# Patient Record
Sex: MALE | Race: WHITE | HISPANIC OR LATINO | Employment: UNEMPLOYED | URBAN - METROPOLITAN AREA
[De-identification: names, ages, dates, MRNs, and addresses within clinical notes are randomized per-mention and may not be internally consistent; named-entity substitution may affect disease eponyms.]

---

## 2023-04-12 ENCOUNTER — HOSPITAL ENCOUNTER (OUTPATIENT)
Dept: RADIOLOGY | Facility: MEDICAL CENTER | Age: 58
End: 2023-04-12
Attending: EMERGENCY MEDICINE

## 2023-04-12 ENCOUNTER — HOSPITAL ENCOUNTER (OUTPATIENT)
Dept: RADIOLOGY | Facility: MEDICAL CENTER | Age: 58
End: 2023-04-12

## 2023-04-12 ENCOUNTER — HOSPITAL ENCOUNTER (INPATIENT)
Facility: MEDICAL CENTER | Age: 58
LOS: 1 days | DRG: 442 | End: 2023-04-13
Attending: EMERGENCY MEDICINE | Admitting: SURGERY
Payer: COMMERCIAL

## 2023-04-12 DIAGNOSIS — S36.113A LACERATION OF LIVER, INITIAL ENCOUNTER: ICD-10-CM

## 2023-04-12 DIAGNOSIS — T14.90XA TRAUMA: ICD-10-CM

## 2023-04-12 DIAGNOSIS — S22.41XA CLOSED FRACTURE OF MULTIPLE RIBS OF RIGHT SIDE, INITIAL ENCOUNTER: ICD-10-CM

## 2023-04-12 PROBLEM — Z53.09 CONTRAINDICATION TO DEEP VEIN THROMBOSIS (DVT) PROPHYLAXIS: Status: ACTIVE | Noted: 2023-04-12

## 2023-04-12 PROBLEM — S36.116A LIVER LACERATION, GRADE IV, WITHOUT OPEN WOUND INTO CAVITY: Status: ACTIVE | Noted: 2023-04-12

## 2023-04-12 PROBLEM — S36.119A LIVER INJURY, INITIAL ENCOUNTER: Status: ACTIVE | Noted: 2023-04-12

## 2023-04-12 PROBLEM — R93.5 ABNORMAL ABDOMINAL CT SCAN: Status: ACTIVE | Noted: 2023-04-12

## 2023-04-12 PROBLEM — S29.8XXA BLUNT CHEST TRAUMA: Status: ACTIVE | Noted: 2023-04-12

## 2023-04-12 LAB
ABO + RH BLD: NORMAL
ABO GROUP BLD: NORMAL
ALBUMIN SERPL BCP-MCNC: 3.2 G/DL (ref 3.2–4.9)
ALBUMIN/GLOB SERPL: 1.1 G/DL
ALP SERPL-CCNC: 71 U/L (ref 30–99)
ALT SERPL-CCNC: 211 U/L (ref 2–50)
ANION GAP SERPL CALC-SCNC: 11 MMOL/L (ref 7–16)
APTT PPP: 25.9 SEC (ref 24.7–36)
AST SERPL-CCNC: 91 U/L (ref 12–45)
BILIRUB SERPL-MCNC: 0.7 MG/DL (ref 0.1–1.5)
BLD GP AB SCN SERPL QL: NORMAL
BUN SERPL-MCNC: 17 MG/DL (ref 8–22)
CALCIUM ALBUM COR SERPL-MCNC: 9.1 MG/DL (ref 8.5–10.5)
CALCIUM SERPL-MCNC: 8.5 MG/DL (ref 8.5–10.5)
CEA SERPL-MCNC: 1.6 NG/ML (ref 0–3)
CFT BLD TEG: 3.8 MIN (ref 4.6–9.1)
CFT P HPASE BLD TEG: 3.5 MIN (ref 4.3–8.3)
CHLORIDE SERPL-SCNC: 103 MMOL/L (ref 96–112)
CLOT ANGLE BLD TEG: 74.9 DEGREES (ref 63–78)
CLOT LYSIS 30M P MA LENFR BLD TEG: 0.8 % (ref 0–2.6)
CO2 SERPL-SCNC: 20 MMOL/L (ref 20–33)
CREAT SERPL-MCNC: 0.58 MG/DL (ref 0.5–1.4)
CT.EXTRINSIC BLD ROTEM: 1.3 MIN (ref 0.8–2.1)
EKG IMPRESSION: NORMAL
ERYTHROCYTE [DISTWIDTH] IN BLOOD BY AUTOMATED COUNT: 46.1 FL (ref 35.9–50)
ETHANOL BLD-MCNC: <10.1 MG/DL
GFR SERPLBLD CREATININE-BSD FMLA CKD-EPI: 113 ML/MIN/1.73 M 2
GLOBULIN SER CALC-MCNC: 2.8 G/DL (ref 1.9–3.5)
GLUCOSE SERPL-MCNC: 152 MG/DL (ref 65–99)
HCT VFR BLD AUTO: 42.2 % (ref 42–52)
HGB BLD-MCNC: 14.1 G/DL (ref 14–18)
INR PPP: 1.1 (ref 0.87–1.13)
LACTATE SERPL-SCNC: 1.8 MMOL/L (ref 0.5–2)
MCF BLD TEG: 60.7 MM (ref 52–69)
MCF.PLATELET INHIB BLD ROTEM: 18.1 MM (ref 15–32)
MCH RBC QN AUTO: 31.1 PG (ref 27–33)
MCHC RBC AUTO-ENTMCNC: 33.4 G/DL (ref 33.7–35.3)
MCV RBC AUTO: 93 FL (ref 81.4–97.8)
PA AA BLD-ACNC: 97.8 % (ref 0–11)
PA ADP BLD-ACNC: 82.8 % (ref 0–17)
PLATELET # BLD AUTO: 195 K/UL (ref 164–446)
PMV BLD AUTO: 11.5 FL (ref 9–12.9)
POTASSIUM SERPL-SCNC: 4.3 MMOL/L (ref 3.6–5.5)
PROT SERPL-MCNC: 6 G/DL (ref 6–8.2)
PROTHROMBIN TIME: 14.1 SEC (ref 12–14.6)
RBC # BLD AUTO: 4.54 M/UL (ref 4.7–6.1)
RH BLD: NORMAL
SODIUM SERPL-SCNC: 134 MMOL/L (ref 135–145)
TEG ALGORITHM TGALG: ABNORMAL
TROPONIN T SERPL-MCNC: 26 NG/L (ref 6–19)
WBC # BLD AUTO: 8.1 K/UL (ref 4.8–10.8)

## 2023-04-12 PROCEDURE — 86900 BLOOD TYPING SEROLOGIC ABO: CPT

## 2023-04-12 PROCEDURE — 84484 ASSAY OF TROPONIN QUANT: CPT

## 2023-04-12 PROCEDURE — 99285 EMERGENCY DEPT VISIT HI MDM: CPT

## 2023-04-12 PROCEDURE — 99223 1ST HOSP IP/OBS HIGH 75: CPT | Performed by: SURGERY

## 2023-04-12 PROCEDURE — 80053 COMPREHEN METABOLIC PANEL: CPT

## 2023-04-12 PROCEDURE — 85384 FIBRINOGEN ACTIVITY: CPT

## 2023-04-12 PROCEDURE — 93005 ELECTROCARDIOGRAM TRACING: CPT | Performed by: NURSE PRACTITIONER

## 2023-04-12 PROCEDURE — 85610 PROTHROMBIN TIME: CPT

## 2023-04-12 PROCEDURE — 82378 CARCINOEMBRYONIC ANTIGEN: CPT

## 2023-04-12 PROCEDURE — 36415 COLL VENOUS BLD VENIPUNCTURE: CPT

## 2023-04-12 PROCEDURE — 86850 RBC ANTIBODY SCREEN: CPT

## 2023-04-12 PROCEDURE — 85027 COMPLETE CBC AUTOMATED: CPT

## 2023-04-12 PROCEDURE — A9270 NON-COVERED ITEM OR SERVICE: HCPCS | Performed by: SURGERY

## 2023-04-12 PROCEDURE — 85347 COAGULATION TIME ACTIVATED: CPT

## 2023-04-12 PROCEDURE — 85576 BLOOD PLATELET AGGREGATION: CPT | Mod: 91

## 2023-04-12 PROCEDURE — 82077 ASSAY SPEC XCP UR&BREATH IA: CPT

## 2023-04-12 PROCEDURE — 305950 HCHG YELLOW TRAUMA ACT PRE-NOTIFY NO CC

## 2023-04-12 PROCEDURE — 83605 ASSAY OF LACTIC ACID: CPT

## 2023-04-12 PROCEDURE — 770001 HCHG ROOM/CARE - MED/SURG/GYN PRIV*

## 2023-04-12 PROCEDURE — 86901 BLOOD TYPING SEROLOGIC RH(D): CPT

## 2023-04-12 PROCEDURE — 700102 HCHG RX REV CODE 250 W/ 637 OVERRIDE(OP): Performed by: SURGERY

## 2023-04-12 PROCEDURE — 85730 THROMBOPLASTIN TIME PARTIAL: CPT

## 2023-04-12 RX ORDER — ONDANSETRON 2 MG/ML
4 INJECTION INTRAMUSCULAR; INTRAVENOUS EVERY 4 HOURS PRN
Status: DISCONTINUED | OUTPATIENT
Start: 2023-04-12 | End: 2023-04-13 | Stop reason: HOSPADM

## 2023-04-12 RX ORDER — CELECOXIB 200 MG/1
200 CAPSULE ORAL 2 TIMES DAILY PRN
Status: DISCONTINUED | OUTPATIENT
Start: 2023-04-17 | End: 2023-04-13 | Stop reason: HOSPADM

## 2023-04-12 RX ORDER — ACETAMINOPHEN 500 MG
1000 TABLET ORAL 2 TIMES DAILY PRN
Status: ON HOLD | COMMUNITY
End: 2023-04-13

## 2023-04-12 RX ORDER — OXYCODONE HYDROCHLORIDE 5 MG/1
5 TABLET ORAL
Status: DISCONTINUED | OUTPATIENT
Start: 2023-04-12 | End: 2023-04-13 | Stop reason: HOSPADM

## 2023-04-12 RX ORDER — ONDANSETRON 4 MG/1
4 TABLET, ORALLY DISINTEGRATING ORAL EVERY 4 HOURS PRN
Status: DISCONTINUED | OUTPATIENT
Start: 2023-04-12 | End: 2023-04-13 | Stop reason: HOSPADM

## 2023-04-12 RX ORDER — ENEMA 19; 7 G/133ML; G/133ML
1 ENEMA RECTAL
Status: DISCONTINUED | OUTPATIENT
Start: 2023-04-12 | End: 2023-04-13 | Stop reason: HOSPADM

## 2023-04-12 RX ORDER — AMOXICILLIN 250 MG
1 CAPSULE ORAL NIGHTLY
Status: DISCONTINUED | OUTPATIENT
Start: 2023-04-12 | End: 2023-04-13 | Stop reason: HOSPADM

## 2023-04-12 RX ORDER — COVID-19 ANTIGEN TEST
440 KIT MISCELLANEOUS 2 TIMES DAILY
Status: ON HOLD | COMMUNITY
End: 2023-04-13

## 2023-04-12 RX ORDER — CELECOXIB 200 MG/1
200 CAPSULE ORAL 2 TIMES DAILY
Status: DISCONTINUED | OUTPATIENT
Start: 2023-04-12 | End: 2023-04-13 | Stop reason: HOSPADM

## 2023-04-12 RX ORDER — POLYETHYLENE GLYCOL 3350 17 G/17G
1 POWDER, FOR SOLUTION ORAL 2 TIMES DAILY
Status: DISCONTINUED | OUTPATIENT
Start: 2023-04-12 | End: 2023-04-13 | Stop reason: HOSPADM

## 2023-04-12 RX ORDER — ACETAMINOPHEN 500 MG
1000 TABLET ORAL EVERY 6 HOURS PRN
Status: DISCONTINUED | OUTPATIENT
Start: 2023-04-17 | End: 2023-04-13 | Stop reason: HOSPADM

## 2023-04-12 RX ORDER — ACETAMINOPHEN 500 MG
1000 TABLET ORAL EVERY 6 HOURS
Status: DISCONTINUED | OUTPATIENT
Start: 2023-04-12 | End: 2023-04-13 | Stop reason: HOSPADM

## 2023-04-12 RX ORDER — HYDROMORPHONE HYDROCHLORIDE 1 MG/ML
0.5 INJECTION, SOLUTION INTRAMUSCULAR; INTRAVENOUS; SUBCUTANEOUS
Status: DISCONTINUED | OUTPATIENT
Start: 2023-04-12 | End: 2023-04-13 | Stop reason: HOSPADM

## 2023-04-12 RX ORDER — AMOXICILLIN 250 MG
1 CAPSULE ORAL
Status: DISCONTINUED | OUTPATIENT
Start: 2023-04-12 | End: 2023-04-13 | Stop reason: HOSPADM

## 2023-04-12 RX ORDER — DOCUSATE SODIUM 100 MG/1
100 CAPSULE, LIQUID FILLED ORAL 2 TIMES DAILY
Status: DISCONTINUED | OUTPATIENT
Start: 2023-04-12 | End: 2023-04-13 | Stop reason: HOSPADM

## 2023-04-12 RX ORDER — OXYCODONE HYDROCHLORIDE 10 MG/1
10 TABLET ORAL
Status: DISCONTINUED | OUTPATIENT
Start: 2023-04-12 | End: 2023-04-13 | Stop reason: HOSPADM

## 2023-04-12 RX ORDER — BISACODYL 10 MG
10 SUPPOSITORY, RECTAL RECTAL
Status: DISCONTINUED | OUTPATIENT
Start: 2023-04-12 | End: 2023-04-13 | Stop reason: HOSPADM

## 2023-04-12 RX ADMIN — ACETAMINOPHEN 1000 MG: 500 TABLET, FILM COATED ORAL at 20:57

## 2023-04-12 RX ADMIN — CELECOXIB 200 MG: 200 CAPSULE ORAL at 20:58

## 2023-04-12 RX ADMIN — OXYCODONE HYDROCHLORIDE 10 MG: 10 TABLET ORAL at 21:34

## 2023-04-12 ASSESSMENT — COGNITIVE AND FUNCTIONAL STATUS - GENERAL
SUGGESTED CMS G CODE MODIFIER MOBILITY: CH
SUGGESTED CMS G CODE MODIFIER DAILY ACTIVITY: CH
MOBILITY SCORE: 24
DAILY ACTIVITIY SCORE: 24

## 2023-04-12 ASSESSMENT — LIFESTYLE VARIABLES
EVER FELT BAD OR GUILTY ABOUT YOUR DRINKING: NO
EVER HAD A DRINK FIRST THING IN THE MORNING TO STEADY YOUR NERVES TO GET RID OF A HANGOVER: NO
DO YOU DRINK ALCOHOL: NO
TOTAL SCORE: 0
AVERAGE NUMBER OF DAYS PER WEEK YOU HAVE A DRINK CONTAINING ALCOHOL: 0
HAVE YOU EVER FELT YOU SHOULD CUT DOWN ON YOUR DRINKING: NO
HAVE PEOPLE ANNOYED YOU BY CRITICIZING YOUR DRINKING: NO
TOTAL SCORE: 0
ON A TYPICAL DAY WHEN YOU DRINK ALCOHOL HOW MANY DRINKS DO YOU HAVE: 0
TOTAL SCORE: 0
HOW MANY TIMES IN THE PAST YEAR HAVE YOU HAD 5 OR MORE DRINKS IN A DAY: 0
ALCOHOL_USE: YES
CONSUMPTION TOTAL: NEGATIVE

## 2023-04-12 ASSESSMENT — PATIENT HEALTH QUESTIONNAIRE - PHQ9
1. LITTLE INTEREST OR PLEASURE IN DOING THINGS: NOT AT ALL
2. FEELING DOWN, DEPRESSED, IRRITABLE, OR HOPELESS: NOT AT ALL
SUM OF ALL RESPONSES TO PHQ9 QUESTIONS 1 AND 2: 0

## 2023-04-12 ASSESSMENT — PAIN DESCRIPTION - PAIN TYPE: TYPE: ACUTE PAIN

## 2023-04-12 ASSESSMENT — FIBROSIS 4 INDEX: FIB4 SCORE: 1.86

## 2023-04-13 ENCOUNTER — APPOINTMENT (OUTPATIENT)
Dept: RADIOLOGY | Facility: MEDICAL CENTER | Age: 58
DRG: 442 | End: 2023-04-13
Attending: NURSE PRACTITIONER
Payer: COMMERCIAL

## 2023-04-13 ENCOUNTER — PHARMACY VISIT (OUTPATIENT)
Dept: PHARMACY | Facility: MEDICAL CENTER | Age: 58
End: 2023-04-13
Payer: COMMERCIAL

## 2023-04-13 VITALS
RESPIRATION RATE: 17 BRPM | TEMPERATURE: 98.6 F | WEIGHT: 170 LBS | HEIGHT: 70 IN | HEART RATE: 85 BPM | SYSTOLIC BLOOD PRESSURE: 120 MMHG | BODY MASS INDEX: 24.34 KG/M2 | OXYGEN SATURATION: 90 % | DIASTOLIC BLOOD PRESSURE: 80 MMHG

## 2023-04-13 PROBLEM — D69.1 QUALITATIVE PLATELET DISORDER (HCC): Status: ACTIVE | Noted: 2023-04-13

## 2023-04-13 LAB
ALBUMIN SERPL BCP-MCNC: 3.4 G/DL (ref 3.2–4.9)
ALBUMIN/GLOB SERPL: 1.5 G/DL
ALP SERPL-CCNC: 72 U/L (ref 30–99)
ALT SERPL-CCNC: 192 U/L (ref 2–50)
ANION GAP SERPL CALC-SCNC: 10 MMOL/L (ref 7–16)
AST SERPL-CCNC: 79 U/L (ref 12–45)
BASOPHILS # BLD AUTO: 0.4 % (ref 0–1.8)
BASOPHILS # BLD: 0.03 K/UL (ref 0–0.12)
BILIRUB SERPL-MCNC: 1.1 MG/DL (ref 0.1–1.5)
BUN SERPL-MCNC: 14 MG/DL (ref 8–22)
CALCIUM ALBUM COR SERPL-MCNC: 9.1 MG/DL (ref 8.5–10.5)
CALCIUM SERPL-MCNC: 8.6 MG/DL (ref 8.5–10.5)
CHLORIDE SERPL-SCNC: 105 MMOL/L (ref 96–112)
CO2 SERPL-SCNC: 22 MMOL/L (ref 20–33)
CREAT SERPL-MCNC: 0.51 MG/DL (ref 0.5–1.4)
EOSINOPHIL # BLD AUTO: 0.11 K/UL (ref 0–0.51)
EOSINOPHIL NFR BLD: 1.5 % (ref 0–6.9)
ERYTHROCYTE [DISTWIDTH] IN BLOOD BY AUTOMATED COUNT: 45.7 FL (ref 35.9–50)
EST. AVERAGE GLUCOSE BLD GHB EST-MCNC: 197 MG/DL
GFR SERPLBLD CREATININE-BSD FMLA CKD-EPI: 117 ML/MIN/1.73 M 2
GLOBULIN SER CALC-MCNC: 2.3 G/DL (ref 1.9–3.5)
GLUCOSE SERPL-MCNC: 118 MG/DL (ref 65–99)
HBA1C MFR BLD: 8.5 % (ref 4–5.6)
HCT VFR BLD AUTO: 39.8 % (ref 42–52)
HGB BLD-MCNC: 13.4 G/DL (ref 14–18)
IMM GRANULOCYTES # BLD AUTO: 0.04 K/UL (ref 0–0.11)
IMM GRANULOCYTES NFR BLD AUTO: 0.5 % (ref 0–0.9)
LYMPHOCYTES # BLD AUTO: 1.63 K/UL (ref 1–4.8)
LYMPHOCYTES NFR BLD: 21.8 % (ref 22–41)
MCH RBC QN AUTO: 30.9 PG (ref 27–33)
MCHC RBC AUTO-ENTMCNC: 33.7 G/DL (ref 33.7–35.3)
MCV RBC AUTO: 91.9 FL (ref 81.4–97.8)
MONOCYTES # BLD AUTO: 0.81 K/UL (ref 0–0.85)
MONOCYTES NFR BLD AUTO: 10.8 % (ref 0–13.4)
NEUTROPHILS # BLD AUTO: 4.85 K/UL (ref 1.82–7.42)
NEUTROPHILS NFR BLD: 65 % (ref 44–72)
NRBC # BLD AUTO: 0 K/UL
NRBC BLD-RTO: 0 /100 WBC
PLATELET # BLD AUTO: 152 K/UL (ref 164–446)
PMV BLD AUTO: 10.9 FL (ref 9–12.9)
POTASSIUM SERPL-SCNC: 4.4 MMOL/L (ref 3.6–5.5)
PROT SERPL-MCNC: 5.7 G/DL (ref 6–8.2)
RBC # BLD AUTO: 4.33 M/UL (ref 4.7–6.1)
SODIUM SERPL-SCNC: 137 MMOL/L (ref 135–145)
TROPONIN T SERPL-MCNC: 27 NG/L (ref 6–19)
TROPONIN T SERPL-MCNC: 32 NG/L (ref 6–19)
WBC # BLD AUTO: 7.5 K/UL (ref 4.8–10.8)

## 2023-04-13 PROCEDURE — 94760 N-INVAS EAR/PLS OXIMETRY 1: CPT

## 2023-04-13 PROCEDURE — A9270 NON-COVERED ITEM OR SERVICE: HCPCS | Performed by: SURGERY

## 2023-04-13 PROCEDURE — RXMED WILLOW AMBULATORY MEDICATION CHARGE: Performed by: PHYSICIAN ASSISTANT

## 2023-04-13 PROCEDURE — 93970 EXTREMITY STUDY: CPT

## 2023-04-13 PROCEDURE — 94669 MECHANICAL CHEST WALL OSCILL: CPT

## 2023-04-13 PROCEDURE — 99239 HOSP IP/OBS DSCHRG MGMT >30: CPT | Performed by: PHYSICIAN ASSISTANT

## 2023-04-13 PROCEDURE — 36415 COLL VENOUS BLD VENIPUNCTURE: CPT

## 2023-04-13 PROCEDURE — 80053 COMPREHEN METABOLIC PANEL: CPT

## 2023-04-13 PROCEDURE — 85025 COMPLETE CBC W/AUTO DIFF WBC: CPT

## 2023-04-13 PROCEDURE — 83036 HEMOGLOBIN GLYCOSYLATED A1C: CPT

## 2023-04-13 PROCEDURE — 84484 ASSAY OF TROPONIN QUANT: CPT

## 2023-04-13 PROCEDURE — 700102 HCHG RX REV CODE 250 W/ 637 OVERRIDE(OP): Performed by: SURGERY

## 2023-04-13 RX ORDER — OXYCODONE HYDROCHLORIDE 5 MG/1
5 TABLET ORAL EVERY 6 HOURS PRN
Qty: 20 TABLET | Refills: 0 | Status: SHIPPED | OUTPATIENT
Start: 2023-04-13 | End: 2023-04-18

## 2023-04-13 RX ORDER — ACETAMINOPHEN 500 MG
1000 TABLET ORAL EVERY 6 HOURS PRN
Qty: 30 TABLET | Refills: 0 | COMMUNITY
Start: 2023-04-17

## 2023-04-13 RX ORDER — PSEUDOEPHEDRINE HCL 30 MG
100 TABLET ORAL 2 TIMES DAILY PRN
COMMUNITY
Start: 2023-04-13

## 2023-04-13 RX ORDER — CELECOXIB 200 MG/1
200 CAPSULE ORAL 2 TIMES DAILY PRN
Qty: 30 CAPSULE | Refills: 0 | Status: SHIPPED | OUTPATIENT
Start: 2023-04-17 | End: 2023-05-02

## 2023-04-13 RX ADMIN — OXYCODONE HYDROCHLORIDE 10 MG: 10 TABLET ORAL at 10:05

## 2023-04-13 RX ADMIN — ACETAMINOPHEN 1000 MG: 500 TABLET, FILM COATED ORAL at 04:00

## 2023-04-13 RX ADMIN — ACETAMINOPHEN 1000 MG: 500 TABLET, FILM COATED ORAL at 12:03

## 2023-04-13 RX ADMIN — CELECOXIB 200 MG: 200 CAPSULE ORAL at 04:00

## 2023-04-13 RX ADMIN — OXYCODONE HYDROCHLORIDE 10 MG: 10 TABLET ORAL at 04:01

## 2023-04-13 ASSESSMENT — ENCOUNTER SYMPTOMS
NECK PAIN: 1
CHILLS: 0
VOMITING: 0
FEVER: 0
HEADACHES: 0
FOCAL WEAKNESS: 0
MYALGIAS: 1
SENSORY CHANGE: 0
NAUSEA: 0
ABDOMINAL PAIN: 0
SHORTNESS OF BREATH: 0
COUGH: 0
BACK PAIN: 1

## 2023-04-13 ASSESSMENT — PAIN DESCRIPTION - PAIN TYPE
TYPE: ACUTE PAIN
TYPE: ACUTE PAIN

## 2023-04-13 NOTE — ED TRIAGE NOTES
Chief Complaint   Patient presents with    Trauma Yellow     BIBA for above complaint. Transfer from [facility]. Patient reports that an ice dam fell on him yesterday afternoon. Reports pain in left chest started this morning. Went to [facility] because of the pain. Ipad  used to get triage. Patient is A+ox4. S

## 2023-04-13 NOTE — ED PROVIDER NOTES
"  ER Provider Note    Scribed for Cliff Giles M.d. by Deirdre Martinez. 4/12/2023  6:17 PM    Primary Care Provider: No primary care provider noted.    CHIEF COMPLAINT  Chief Complaint   Patient presents with    Trauma Yellow     EXTERNAL RECORDS REVIEWED  External ED Note multiple right-sided rib fractures, grade 4 liver laceration    HPI/ROS  LIMITATION TO HISTORY   Select: : None  OUTSIDE HISTORIAN(S):  EMS assisted with the patients history     James Ford is a 57 y.o. male who presents to the ED has a trauma yellow transfer from [facility] for higher level care of a grade 4 liver laceration and right sided rib fractures. He states that a large piece of ice fell on him yesterday at 11 am. He was standing at the time. He did not have any symptoms yesterday but reports waking up this morning with chest pain. The pain began to increase in severity, prompting him to go to [facility]. While his fractures are on the right side he complains about left sided chest pain that is 7/10. He has a laceration He told EMS that he did not want anymore pain medications. H received morphine and Zofran at [facility] prior to transfer. Patient is Sammarinese speaking and an  was used.    PAST MEDICAL HISTORY  None noted.     SURGICAL HISTORY  None noted.     FAMILY HISTORY  None noted.     SOCIAL HISTORY   reports that he has never smoked. He has never used smokeless tobacco. He reports that he does not drink alcohol and does not use drugs.    CURRENT MEDICATIONS  Current Discharge Medication List        CONTINUE these medications which have NOT CHANGED    Details   Naproxen Sodium (ALEVE) 220 MG Cap Take 440 mg by mouth 2 times a day.      acetaminophen (TYLENOL) 500 MG Tab Take 1,000 mg by mouth 2 times a day as needed.             ALLERGIES  Patient has no allergy information on record.    PHYSICAL EXAM  /78   Pulse 88   Temp 36.9 °C (98.5 °F) (Temporal)   Resp 20   Ht 1.778 m (5' 10\")   Wt " 77.1 kg (170 lb)   SpO2 98%   BMI 24.39 kg/m²   Gen: Alert, oriented  HENT: No racoon eyes, septal hematoma, facial instability.  Abrasion right side of face  Eye: EOMI, no chemosis, PERRL  Neck: trachea midline, no tenderness  Resp: no respiratory distress, bilateral chest wall tenderness or without crepitus  CV: No JVD, RRR.  + peripheral pulses  Abd: soft, non-distended, non-tender. No ecchymosis  Back: no spinal tenderness or deformities  Ext: No deformities, tenderness or edema  Psych: normal mood  Neuro: speech fluent, moves all extremities. GCS 15    DIAGNOSTIC STUDIES    Labs:   Labs Reviewed   CBC WITHOUT DIFFERENTIAL - Abnormal; Notable for the following components:       Result Value    RBC 4.54 (*)     MCHC 33.4 (*)     All other components within normal limits   PLATELET MAPPING WITH BASIC TEG - Abnormal; Notable for the following components:    Reaction Time Initial-R 3.8 (*)     React Time Initial Hep 3.5 (*)     % Inhibition ADP 82.8 (*)     % Inhibition AA 97.8 (*)     All other components within normal limits   COMP METABOLIC PANEL - Abnormal; Notable for the following components:    Sodium 134 (*)     Glucose 152 (*)     AST(SGOT) 91 (*)     ALT(SGPT) 211 (*)     All other components within normal limits    Narrative:     Recollect: Specimen hemolyzed.  Notified: RN [id number]   TROPONIN - Abnormal; Notable for the following components:    Troponin T 26 (*)     All other components within normal limits    Narrative:     Recollect: Specimen hemolyzed.  Notified: RN [id number]   PROTHROMBIN TIME   APTT   LACTIC ACID   COD (ADULT)   CEA   DIAGNOSTIC ALCOHOL    Narrative:     Recollect: Specimen hemolyzed.  Notified: RN [id number]   ABO RH CONFIRM    Narrative:     Recollect: Specimen hemolyzed.  Notified: RN [id number]   CORRECTED CALCIUM    Narrative:     Recollect: Specimen hemolyzed.  Notified: RN [id number]   ESTIMATED GFR    Narrative:     Recollect: Specimen hemolyzed.  Notified: RN [id number]   COMPONENT CELLULAR    OCCULT BLOOD STOOL   HEMOGLOBIN A1C     COURSE & MEDICAL DECISION MAKING     ED Observation Status? No; Patient does not meet criteria for ED Observation.     INITIAL ASSESSMENT, COURSE AND PLAN  Care Narrative: 57-year-old gentleman presents with a story of an ice dam falling from a roof and striking him yesterday.  He was found to have a liver laceration, however has no abdominal tenderness.  Thankfully, no evidence of active extravasation.  He does have multiple right-sided rib fractures although has an equal amount of pain on the left side of his chest, unclear whether these are subacute versus acute.  No signs of pulmonary contusion or pneumothorax.  Case discussed at the bedside with Dr. Roque, trauma surgeon, who will evaluate the patient for hospitalization.  Patient is high risk for decompensation given his extensive solid organ injury.    6:17  PM - Patient seen and examined at bedside.         DISPOSITION AND DISCUSSIONS  I have discussed management of the patient with the following physicians and TEDDY's:  Dr. Roque (Surgery)         CRITICAL CARE  The very real possibilty of a deterioration of this patient's condition required the highest level of my preparedness for sudden, emergent intervention.  I provided critical care services, which included medication orders, frequent reevaluations of the patient's condition and response to treatment, ordering and reviewing test results, and discussing the case with various consultants.  The critical care time associated with the care of the patient was 32 minutes. Review chart for interventions. This time is exclusive of any other billable procedures.       DISPOSITION:  Patient will be hospitalized by Dr. Dr. Roque  in guarded condition.    FINAL DIANGOSIS  1. Closed fracture of multiple ribs of right side, initial encounter    2. Laceration of liver, initial encounter       I, Deirdre Martinez (Anthonyibchristian), am scribing for, and in the presence of, Nestor MADERA  JULIENNE Roque.    Electronically signed by: Deirdre Martinez (Scribe), 4/12/2023    Nestor CAMPOS M.D. personally performed the services described in this documentation, as scribed by Deirdre Martinez in my presence, and it is both accurate and complete.      The note accurately reflects work and decisions made by me.  Cliff Giles M.D.  4/12/2023  9:55 PM

## 2023-04-13 NOTE — ED NOTES
Pt. To 21 elena at this time.  Pt. Connected to b/p and continuous spo2 monitors.  O2 tank being used at 3/4 full at this time.  Pt. Texting on cell phone.  Will obtain  for eval.

## 2023-04-13 NOTE — DISCHARGE SUMMARY
Trauma Discharge Summary    DATE OF ADMISSION: 4/12/2023    DATE OF DISCHARGE: 4/13/2023    LENGTH OF STAY: 2 days    ATTENDING PHYSICIAN: Nestor Roque M.D.    CONSULTING PHYSICIAN:   1. N/A    DISCHARGE DIAGNOSIS:  Active Problems:    Liver laceration, grade IV, without open wound into cavity POA: Yes    Closed fracture of multiple ribs of right side POA: Yes    Contraindication to deep vein thrombosis (DVT) prophylaxis POA: Yes    Blunt chest trauma POA: Yes    Abnormal abdominal CT scan POA: Yes    Trauma POA: Yes  Resolved Problems:    * No resolved hospital problems. *      PROCEDURES:  1. N/A    HISTORY OF PRESENT ILLNESS: The patient is a 57 y.o. male who was reportedly injured when a large piece of ice fell on him the day before. He was hit in the head and chest. The patient was initially evaluated at [facility] where he was found to have rib fractures and a liver laceration. The patient was transferred to [facility] in [place], [place].    HOSPITAL COURSE: The patient was triaged as a trauma yellow transfer activation. The patient was transported to the general surgery josé for care.  The patient was found to have rib fractures on the right 1 through 6.  These are nondisplaced fractures.  This injury was treated nonoperatively with aggressive pulmonary hygiene, multimodal pain management and serial chest radiographs.  The patient's chest radiographs remained stable throughout his stay.  The patient was found to have a grade 4 liver laceration.  The patient presented more than 24 hours after his initial injury.  There was no active extravasation on CT scan.  The patient's hemoglobin was stable.  This injury was treated with serial abdominal examinations and trending of hemoglobin.  The patient's abdominal examinations and hemoglobin remained stable throughout his stay.  The patient was found to have blunt chest trauma.  An EKG and troponins were studied and found to be  consistent with blunt chest trauma.  This was treated with analgesia.  The patient had a incidental finding of possible colonic mass on CT scan.  The patient's CEA was normal at 1.6.  The patient was referred to GI after discharge.  The patient does have a primary care appointment with [facility] next month.   On the day of discharge patient was afebrile, vital signs stable, pain controlled on current regimen, oxygen saturation greater than 90% on room air, ambulating with minimal assistance and tolerating regular diet.  Discharge instructions, medications and follow-up were discussed with the patient in detail.  The patient was then discharged home in stable condition.    HOSPITAL PROBLEM LIST:  Closed fracture of multiple ribs of right side- (present on admission)  Assessment & Plan  Right 1-6th nondisplaced rib fractures.  Aggressive multimodal pain management and pulmonary hygiene. Serial chest radiographs.    Liver laceration, grade IV, without open wound into cavity- (present on admission)  Assessment & Plan  Grade 4 hepatic laceration with no evidence of active extravasation.  24 hrs post injury. Hemoglobin stable.  Trend hemograms.    Abnormal abdominal CT scan- (present on admission)  Assessment & Plan  Incidental finding. Asymmetric focal colonic wall thickening/mass associated with the mesenteric margin of the mid sigmoid colon concerning for colonic mass/carcinoma.   Recommend colonoscopy.    Blunt chest trauma- (present on admission)  Assessment & Plan  EKG and trop consistent with chest trauma.    Contraindication to deep vein thrombosis (DVT) prophylaxis- (present on admission)  Assessment & Plan  Prophylactic anticoagulation for thrombotic prevention initially contraindicated secondary to elevated bleeding risk.  4/13 Trauma surveillance venous duplex scanning ordered.     Trauma- (present on admission)  Assessment & Plan  Ice damn from roof fell on him 24 hrs prior to presenting.  Trauma Yellow  Transfer Activation from [facility] in [place], [place].  Nestor Roque MD. Trauma Surgery.          DISPOSITION: Discharged home in stable condition on 4/13/2023. The patient was counseled and questions were answered. Specifically, signs and symptoms of infection, respiratory decompensation and persistent or worsening pain were discussed and the patient agrees to seek medical attention if any of these develop.    DISCHARGE MEDICATIONS:  The patients controlled substance history was reviewed and a controlled substance use informed consent (if applicable) was provided by [facility] and the patient has been prescribed.     Medication List        START taking these medications        Instructions   celecoxib 200 MG Caps  Start taking on: April 17, 2023  Commonly known as: CELEBREX   Take 1 Capsule by mouth 2 times a day as needed for Mild Pain or Moderate Pain for up to 15 days.  Dose: 200 mg     docusate sodium 100 MG Caps   Take 100 mg by mouth 2 times a day as needed for Constipation.  Dose: 100 mg     oxyCODONE immediate-release 5 MG Tabs  Commonly known as: ROXICODONE   Take 1 Tablet by mouth every 6 hours as needed for Severe Pain for up to 5 days.  Dose: 5 mg            CHANGE how you take these medications        Instructions   acetaminophen 500 MG Tabs  Start taking on: April 17, 2023  What changed:   when to take this  reasons to take this  These instructions start on April 17, 2023. If you are unsure what to do until then, ask your doctor or other care provider.  Commonly known as: TYLENOL   Take 2 Tablets by mouth every 6 hours as needed for Mild Pain or Moderate Pain.  Dose: 1,000 mg            STOP taking these medications      Aleve 220 MG Caps  Generic drug: Naproxen Sodium              ACTIVITY:  As tolerated.  No strenuous activities for 2 weeks.    WOUND CARE:  N/A    DIET:  Regular    FOLLOW UP:  Nestor Roque M.D.  [address]  [place]  [address]  [phone]    Schedule an appointment as soon as possible for a visit in 1 week(s)  For follow up, If symptoms worsen, As needed    [facility]  [address]  [place] [address]  [phone]  Schedule an appointment as soon as possible for a visit  For follow up on focal colonic wall thickening/mass incidentally found on CT imaging.    Jakob Whitfield D.N.P.  [address]  [place] [address] [phone]    Go on 6/1/2023  Please go to appointment at 7:15 AM to discuss referral to GI physician for colonoscopy regarding possible colonic mass found on CT imaging.      TIME SPENT ON DISCHARGE: 35 minutes      ____________________________________________  Bernie Aguilar P.A.-C.    DD: 4/13/2023 2:59 PM

## 2023-04-13 NOTE — CARE PLAN
The patient is Watcher - Medium risk of patient condition declining or worsening    Shift Goals  Clinical Goals: monitor LFT, trops  Patient Goals: comfort, pain control  Family Goals: pain control    Progress made toward(s) clinical / shift goals:    Problem: Knowledge Deficit - Standard  Goal: Patient and family/care givers will demonstrate understanding of plan of care, disease process/condition, diagnostic tests and medications  Outcome: Progressing  Note: Pt updated and understanding of his plan of care.      Problem: Pain - Standard  Goal: Alleviation of pain or a reduction in pain to the patient’s comfort goal  Outcome: Progressing  Note: Pain relieved with current interventions in place.        Pt arrived from ED @ 2030- friend @ the bedside. C/o 10/10 chest pain. PRN medication given with relief. Pt able to sleep. On full liquid diet.-- Plan of care translated via video . Family updated @ the bedside. VSS

## 2023-04-13 NOTE — DISCHARGE PLANNING
Trauma Yellow Transfer    James Liliana  1965    Pt arrived via [facility] as a transfer from [facility]. Pt stated a large ice damn landed on his chest yesterday from the roof. Pt is Slovenian Speaking only, he has his phone with him to keep in touch with family.     SW met with Pt. And used Interpretor Pad to ask for Emergency Contact information. Pt has two friends at bedside. Interpretor Number [phone].     Emergency Contact:   Dawna Irwin [phone] (Friend)   none

## 2023-04-13 NOTE — ED NOTES
58 YO male BIB [facility] transfer from [facility]   Patient states yesterday large ice damn landed on chest, abdomen from his roof.  Denies LOC at the time.   Patient went to [facility] where it is reported his injuries are as follow:   Gd 4 Liver lac   Right sided rib fractures   GCS 15      /71  HR 88  RR 18  95% 2L NC     No known hx, allergies, and medications.

## 2023-04-13 NOTE — CARE PLAN
Problem: Hyperinflation  Goal: Prevent or improve atelectasis  Description: Target End Date:  3 to 4 days    1. Instruct incentive spirometry usage  2.  Perform hyperinflation therapy as indicated  Outcome: Progressing    Respiratory Update    Treatment modality: pep  is 2000  Frequency: QID    Pt tolerating current treatments well with no adverse reactions.

## 2023-04-13 NOTE — PROGRESS NOTES
Received report from previous shift RN  Assessment complete.  A&O x 4, Guinean speaking, communicates well with this RN. Patient calls appropriately.  Patient ambulates with standby assist.   Patient has 8/10 pain. Pain managed with prescribed medications.  Denies N&V. Tolerating regular diet.  Skin per flowsheet.  + void, +flatus, - BM.  Patient denies SOB. Spo2>95% on 2L nasal cannula.  Reviewed plan of care with patient. Call light and personal belongings with in reach. Hourly rounding in place. All needs met at this time.

## 2023-04-13 NOTE — PROGRESS NOTES
Trauma / Surgical Daily Progress Note    Date of Service  4/13/2023    Chief Complaint  57 y.o. male admitted 4/12/2023 with Grade 4 liver lac, rib fractures after ice fell onto him.    Interval Events  SBIRT and tertiary survey completed.  No new injuries, left chest wall tenderness.  Troponin mildly elevated but stable.  Tolerating regular diet.    - PT/OT for d/c safety recommendations  - Mobilize patient  - Patient will need follow up with GI for colonoscopy  - Anticipate d/c home today pending therapy recs    Review of Systems  Review of Systems   Constitutional:  Negative for chills and fever.   Respiratory:  Negative for cough and shortness of breath.    Gastrointestinal:  Negative for abdominal pain, nausea and vomiting.   Genitourinary:         Voiding   Musculoskeletal:  Positive for back pain, myalgias and neck pain.   Neurological:  Negative for sensory change, focal weakness and headaches.      Vital Signs  Temp:  [36.8 °C (98.2 °F)-37.1 °C (98.8 °F)] 36.8 °C (98.2 °F)  Pulse:  [78-96] 80  Resp:  [16-20] 16  BP: ()/(64-81) 112/64  SpO2:  [92 %-98 %] 93 %    Physical Exam  Physical Exam  Vitals and nursing note reviewed.   Constitutional:       General: He is awake. He is not in acute distress.     Appearance: Normal appearance. He is not ill-appearing.   HENT:      Head: Normocephalic.      Right Ear: External ear normal.      Left Ear: External ear normal.      Nose: Nose normal.      Mouth/Throat:      Mouth: Mucous membranes are moist.   Eyes:      General: No scleral icterus.        Right eye: No discharge.         Left eye: No discharge.   Cardiovascular:      Rate and Rhythm: Normal rate and regular rhythm.      Pulses: Normal pulses.   Pulmonary:      Effort: Pulmonary effort is normal. No respiratory distress.      Breath sounds: Examination of the right-lower field reveals decreased breath sounds. Examination of the left-lower field reveals decreased breath sounds. Decreased breath  sounds present.   Chest:      Chest wall: Swelling and tenderness present. No lacerations, deformity or crepitus.      Comments: Left anterior and right lateral tenderness  Abdominal:      General: Abdomen is flat. There is no distension.      Palpations: Abdomen is soft.      Tenderness: There is no abdominal tenderness.   Musculoskeletal:      Cervical back: Neck supple. Muscular tenderness present. No pain with movement or spinous process tenderness.   Skin:     General: Skin is warm and dry.      Capillary Refill: Capillary refill takes less than 2 seconds.      Findings: Bruising, ecchymosis and signs of injury present.   Neurological:      Mental Status: He is alert and oriented to person, place, and time.      GCS: GCS eye subscore is 4. GCS verbal subscore is 5. GCS motor subscore is 6.      Sensory: Sensation is intact.      Motor: Motor function is intact.   Psychiatric:         Attention and Perception: Attention normal.         Mood and Affect: Mood normal.         Behavior: Behavior is cooperative.       Laboratory  Recent Results (from the past 24 hour(s))   CBC WITHOUT DIFFERENTIAL    Collection Time: 04/12/23  5:55 PM   Result Value Ref Range    WBC 8.1 4.8 - 10.8 K/uL    RBC 4.54 (L) 4.70 - 6.10 M/uL    Hemoglobin 14.1 14.0 - 18.0 g/dL    Hematocrit 42.2 42.0 - 52.0 %    MCV 93.0 81.4 - 97.8 fL    MCH 31.1 27.0 - 33.0 pg    MCHC 33.4 (L) 33.7 - 35.3 g/dL    RDW 46.1 35.9 - 50.0 fL    Platelet Count 195 164 - 446 K/uL    MPV 11.5 9.0 - 12.9 fL   Prothrombin Time    Collection Time: 04/12/23  5:55 PM   Result Value Ref Range    PT 14.1 12.0 - 14.6 sec    INR 1.10 0.87 - 1.13   APTT    Collection Time: 04/12/23  5:55 PM   Result Value Ref Range    APTT 25.9 24.7 - 36.0 sec   LACTIC ACID    Collection Time: 04/12/23  5:55 PM   Result Value Ref Range    Lactic Acid 1.8 0.5 - 2.0 mmol/L   PLATELET MAPPING WITH BASIC TEG    Collection Time: 04/12/23  5:55 PM   Result Value Ref Range    Reaction Time  Initial-R 3.8 (L) 4.6 - 9.1 min    React Time Initial Hep 3.5 (L) 4.3 - 8.3 min    Clot Kinetics-K 1.3 0.8 - 2.1 min    Clot Angle-Angle 74.9 63.0 - 78.0 degrees    Maximum Clot Strength-MA 60.7 52.0 - 69.0 mm    TEG Functional Fibrinogen(MA) 18.1 15.0 - 32.0 mm    Lysis 30 minutes-LY30 0.8 0.0 - 2.6 %    % Inhibition ADP 82.8 (H) 0.0 - 17.0 %    % Inhibition AA 97.8 (H) 0.0 - 11.0 %    TEG Algorithm Link Algorithm    COD - Adult (Type and Screen)    Collection Time: 23  5:55 PM   Result Value Ref Range    ABO Grouping Only A     Rh Grouping Only POS     Antibody Screen-Cod NEG    CEA    Collection Time: 23  5:55 PM   Result Value Ref Range    Carcinoembryonic Antigen 1.6 0.0 - 3.0 ng/mL   EKG    Collection Time: 23  6:22 PM   Result Value Ref Range    Report       [facility]    Test Date:  2023  Pt Name:    MICHAEL VALLADARSE  Department: ER  MRN:        [id number]                      Room:        21  Gender:     Male                         Technician: [id number]  :        1965                   Requested By:HILARY DELVALLE  Order #:    [id number]                    Reading MD: Cliff Giles    Measurements  Intervals                                Axis  Rate:       86                           P:          18  WV:         134                          QRS:        -40  QRSD:       93                           T:          13  QT:         363  QTc:        434    Interpretive Statements  Sinus rhythm  Left axis deviation  ST elevation, consider anterior injury  Baseline wander in lead(s) V4  No previous ECG available for comparison  Electronically Signed On 2023 18:35:07 PDT by Cliff Giles     Comp Metabolic Panel    Collection Time: 23  8:22 PM   Result Value Ref Range    Sodium 134 (L) 135 - 145 mmol/L    Potassium 4.3 3.6 - 5.5 mmol/L    Chloride 103 96 - 112 mmol/L    Co2 20 20 - 33 mmol/L    Anion Gap 11.0 7.0 - 16.0    Glucose 152 (H)  65 - 99 mg/dL    Bun 17 8 - 22 mg/dL    Creatinine 0.58 0.50 - 1.40 mg/dL    Calcium 8.5 8.5 - 10.5 mg/dL    AST(SGOT) 91 (H) 12 - 45 U/L    ALT(SGPT) 211 (H) 2 - 50 U/L    Alkaline Phosphatase 71 30 - 99 U/L    Total Bilirubin 0.7 0.1 - 1.5 mg/dL    Albumin 3.2 3.2 - 4.9 g/dL    Total Protein 6.0 6.0 - 8.2 g/dL    Globulin 2.8 1.9 - 3.5 g/dL    A-G Ratio 1.1 g/dL   DIAGNOSTIC ALCOHOL    Collection Time: 04/12/23  8:22 PM   Result Value Ref Range    Diagnostic Alcohol <10.1 <10.1 mg/dL   TROPONIN    Collection Time: 04/12/23  8:22 PM   Result Value Ref Range    Troponin T 26 (H) 6 - 19 ng/L   ABO Rh Confirm    Collection Time: 04/12/23  8:22 PM   Result Value Ref Range    ABO Rh Confirm A POS    CORRECTED CALCIUM    Collection Time: 04/12/23  8:22 PM   Result Value Ref Range    Correct Calcium 9.1 8.5 - 10.5 mg/dL   ESTIMATED GFR    Collection Time: 04/12/23  8:22 PM   Result Value Ref Range    GFR (CKD-EPI) 113 >60 mL/min/1.73 m 2   CBC with Differential: Tomorrow AM    Collection Time: 04/13/23  2:26 AM   Result Value Ref Range    WBC 7.5 4.8 - 10.8 K/uL    RBC 4.33 (L) 4.70 - 6.10 M/uL    Hemoglobin 13.4 (L) 14.0 - 18.0 g/dL    Hematocrit 39.8 (L) 42.0 - 52.0 %    MCV 91.9 81.4 - 97.8 fL    MCH 30.9 27.0 - 33.0 pg    MCHC 33.7 33.7 - 35.3 g/dL    RDW 45.7 35.9 - 50.0 fL    Platelet Count 152 (L) 164 - 446 K/uL    MPV 10.9 9.0 - 12.9 fL    Neutrophils-Polys 65.00 44.00 - 72.00 %    Lymphocytes 21.80 (L) 22.00 - 41.00 %    Monocytes 10.80 0.00 - 13.40 %    Eosinophils 1.50 0.00 - 6.90 %    Basophils 0.40 0.00 - 1.80 %    Immature Granulocytes 0.50 0.00 - 0.90 %    Nucleated RBC 0.00 /100 WBC    Neutrophils (Absolute) 4.85 1.82 - 7.42 K/uL    Lymphs (Absolute) 1.63 1.00 - 4.80 K/uL    Monos (Absolute) 0.81 0.00 - 0.85 K/uL    Eos (Absolute) 0.11 0.00 - 0.51 K/uL    Baso (Absolute) 0.03 0.00 - 0.12 K/uL    Immature Granulocytes (abs) 0.04 0.00 - 0.11 K/uL    NRBC (Absolute) 0.00 K/uL   Comp Metabolic Panel (CMP):  Tomorrow AM    Collection Time: 04/13/23  2:26 AM   Result Value Ref Range    Sodium 137 135 - 145 mmol/L    Potassium 4.4 3.6 - 5.5 mmol/L    Chloride 105 96 - 112 mmol/L    Co2 22 20 - 33 mmol/L    Anion Gap 10.0 7.0 - 16.0    Glucose 118 (H) 65 - 99 mg/dL    Bun 14 8 - 22 mg/dL    Creatinine 0.51 0.50 - 1.40 mg/dL    Calcium 8.6 8.5 - 10.5 mg/dL    AST(SGOT) 79 (H) 12 - 45 U/L    ALT(SGPT) 192 (H) 2 - 50 U/L    Alkaline Phosphatase 72 30 - 99 U/L    Total Bilirubin 1.1 0.1 - 1.5 mg/dL    Albumin 3.4 3.2 - 4.9 g/dL    Total Protein 5.7 (L) 6.0 - 8.2 g/dL    Globulin 2.3 1.9 - 3.5 g/dL    A-G Ratio 1.5 g/dL   TROPONIN    Collection Time: 04/13/23  2:26 AM   Result Value Ref Range    Troponin T 27 (H) 6 - 19 ng/L   HEMOGLOBIN A1C    Collection Time: 04/13/23  2:26 AM   Result Value Ref Range    Glycohemoglobin 8.5 (H) 4.0 - 5.6 %    Est Avg Glucose 197 mg/dL   CORRECTED CALCIUM    Collection Time: 04/13/23  2:26 AM   Result Value Ref Range    Correct Calcium 9.1 8.5 - 10.5 mg/dL   ESTIMATED GFR    Collection Time: 04/13/23  2:26 AM   Result Value Ref Range    GFR (CKD-EPI) 117 >60 mL/min/1.73 m 2   TROPONIN    Collection Time: 04/13/23  5:03 AM   Result Value Ref Range    Troponin T 32 (H) 6 - 19 ng/L       Fluids    Intake/Output Summary (Last 24 hours) at 4/13/2023 0703  Last data filed at 4/12/2023 1757  Gross per 24 hour   Intake 0 ml   Output 0 ml   Net 0 ml       Core Measures & Quality Metrics  Labs reviewed, Medications reviewed and Radiology images reviewed  Dunn catheter: No Dunn      DVT Prophylaxis: Contraindicated - High bleeding risk  DVT prophylaxis - mechanical: SCDs  Ulcer prophylaxis: Not indicated    Assessed for rehab: Patient was assess for and/or received rehabilitation services during this hospitalization  RAP Score Total: 6  CAGE Results: negative Blood Alcohol>0.08: no     Assessment/Plan  Closed fracture of multiple ribs of right side- (present on admission)  Assessment & Plan  Right  1-6th nondisplaced rib fractures.  Aggressive multimodal pain management and pulmonary hygiene. Serial chest radiographs.    Liver laceration, grade IV, without open wound into cavity- (present on admission)  Assessment & Plan  Grade 4 hepatic laceration with no evidence of active extravasation.  24 hrs post injury. Hemoglobin stable.  Trend hemograms.    Abnormal abdominal CT scan- (present on admission)  Assessment & Plan  Incidental finding. Asymmetric focal colonic wall thickening/mass associated with the mesenteric margin of the mid sigmoid colon concerning for colonic mass/carcinoma.   Recommend colonoscopy.    Blunt chest trauma- (present on admission)  Assessment & Plan  EKG, trop pending.    Contraindication to deep vein thrombosis (DVT) prophylaxis- (present on admission)  Assessment & Plan  Prophylactic anticoagulation for thrombotic prevention initially contraindicated secondary to elevated bleeding risk.  4/13 Trauma surveillance venous duplex scanning ordered.     Trauma- (present on admission)  Assessment & Plan  Ice damn from roof fell on him 24 hrs prior to presenting.  Trauma Yellow Transfer Activation from [facility] in [place], [place].  Nestor Roque MD. Trauma Surgery.    Mental status adequate for full examination?: Yes    Spine cleared (radiologically and/or clinically): Yes    All current laboratory studies/radiology exams reviewed: Yes    Medications reconciliation has been reviewed: Yes    Completed Consultations:  N/A     Pending Consultations:  None    Newly Identified Diagnoses and Injuries:  Left chest wall contusion      Discussed patient condition with RN, Therapies, Patient, and trauma surgery. Dr. Roque

## 2023-04-13 NOTE — CONSULTS
Nestor Roque M.D.    Date & Time note created:    4/12/2023   7:17 PM     Referring MD / APRN:  No primary care provider on file., Nestor Roque M.D.    Patient ID:  Name:             James Ford     YOB: 1965  Age:                 57 y.o.  male   MRN:               [id number]    Chief Complaint/Reason for Visit:     Trauma Yellow  Patient was transferred from [facility].  His history initially given was that and I stand him fell off the roof and knocked him down yesterday morning.  He had some injuries to his face and chest but really was not in very much pain.  He apparently did not seek any medical attention it is unclear what his activity was until 6 AM this morning when he woke up this morning he said he had some pain in his left chest.  This seemed to worsen over the course of the day and his nose began to hurting more and ultimately he decided to go to the hospital for evaluation.  There is his chief complaint was nasal pain.  He did have some stigmata of craniofacial trauma and was noted on physical examination to have pain and some palpable crepitus in the right chest.  This led to a rather exhaustive radiologic evaluation it should be noted he was hemodynamically stable.  CT scans of the face and head showed some sinus congestion but no other abnormalities or fractures.  CT scan of his cervical thoracic and lumbar spines were negative for fracture but did show some degenerative diseases.  CT scan of his chest showed 4 right-sided rib fractures without pulmonary parenchymal or mediastinal injury.  These were noted minimally displaced.  CT scan of the abdomen was interpreted as showing a grade 4 liver laceration without any free fluid in the abdomen or subcapsular fluid of the liver.  He was noted to have no lymphadenopathy of the abdomen.  There was a questionable abnormality involving the sigmoid colon with an eccentric fatty density mass on the  antimesenteric border which the radiologist thought might represent a colon cancer the patient has not had any hematochezia or obstructive symptoms of the colon but has not had previous colonoscopy.  Medically he claims to have had a diagnosis of diabetes but does not take any medication he is not really complaining of abdominal pain.  He is tender in the right chest but interestingly he was complaining of left-sided chest pain.        Review of Systems:  ROS patient is complaining of some left chest pain and nasal pain he denies any difficulty with his vision.  He has no headache.  He denies abdominal pain he does have some right chest wall tenderness.  He has not had any hematuria or hematochezia    Past Medical History:   History reviewed. No pertinent past medical history.    Past Surgical History:  History reviewed. No pertinent surgical history.    Current Outpatient Medications:  Current Facility-Administered Medications   Medication Dose Route Frequency Provider Last Rate Last Admin    Respiratory Therapy Consult   Nebulization Continuous RT Nestor Roque M.D.        Pharmacy Consult Request ...Pain Management Review 1 Each  1 Each Other PHARMACY TO DOSE Nestor Roque M.D.        ondansetron (ZOFRAN) syringe/vial injection 4 mg  4 mg Intravenous Q4HRS PRN Nestor Roque M.D.        ondansetron (ZOFRAN ODT) dispertab 4 mg  4 mg Oral Q4HRS PRN Nestor Roque M.D.        docusate sodium (COLACE) capsule 100 mg  100 mg Oral BID Nestor Roque M.D.        senna-docusate (PERICOLACE or SENOKOT S) 8.6-50 MG per tablet 1 Tablet  1 Tablet Oral Nightly Nestor Roque M.D.        senna-docusate (PERICOLACE or SENOKOT S) 8.6-50 MG per tablet 1 Tablet  1 Tablet Oral Q24HRS PRN Nestor Roque M.D.        polyethylene glycol/lytes (MIRALAX) PACKET 1 Packet  1 Packet Oral BID Nestor Roque M.D.        [START ON 4/13/2023] magnesium hydroxide (MILK OF MAGNESIA) suspension 30 mL  30 mL Oral DAILY Nestor Roque M.D.         bisacodyl (DULCOLAX) suppository 10 mg  10 mg Rectal Q24HRS PRN Nestor Roque M.D.        sodium phosphate (Fleet) enema 133 mL  1 Each Rectal Once PRN Nestor Roque M.D.        acetaminophen (TYLENOL) tablet 1,000 mg  1,000 mg Oral Q6HRS Nestor Roque M.D.        Followed by    [START ON 4/17/2023] acetaminophen (TYLENOL) tablet 1,000 mg  1,000 mg Oral Q6HRS PRN Nestor Roque M.D.        celecoxib (CELEBREX) capsule 200 mg  200 mg Oral BID Nestor Roque M.D.        Followed by    [START ON 4/17/2023] celecoxib (CELEBREX) capsule 200 mg  200 mg Oral BID PRN Nestor Roque M.D.        oxyCODONE immediate-release (ROXICODONE) tablet 5 mg  5 mg Oral Q3HRS PRN Nestor Roque M.D.        Or    oxyCODONE immediate release (ROXICODONE) tablet 10 mg  10 mg Oral Q3HRS PRN Nestor Roque M.D.        Or    HYDROmorphone (Dilaudid) injection 0.5 mg  0.5 mg Intravenous Q3HRS PRN Nestor Roque M.D.         No current outpatient medications on file.       Allergies:  No Known Allergies    Family History:  History reviewed. No pertinent family history.    Social History:  Social History     Socioeconomic History    Marital status: Not on file     Spouse name: Not on file    Number of children: Not on file    Years of education: Not on file    Highest education level: Not on file   Occupational History    Not on file   Tobacco Use    Smoking status: Never    Smokeless tobacco: Never   Substance and Sexual Activity    Alcohol use: Never    Drug use: Never    Sexual activity: Not on file   Other Topics Concern    Not on file   Social History Narrative    Not on file     Social Determinants of Health     Financial Resource Strain: Not on file   Food Insecurity: Not on file   Transportation Needs: Not on file   Physical Activity: Not on file   Stress: Not on file   Social Connections: Not on file   Intimate Partner Violence: Not on file   Housing Stability: Not on file          Physical Exam:  Physical Exam  Patient is a healthy  "appearing  male stated age.  He does have some bruising across the face and almost like a blister type injury to the right cheek.  There is no facial instability.  His cranial nerves are intact.  He does not have malocclusion.  His neck is completely nontender he is not in a cervical collar he has good range of motion no laryngeal crepitus no thyroid abnormality no jugular venous distention no palpable lymphadenopathy and his trachea is midline.  There is actually some crepitus in the right chest wall and some tenderness.  This is lateral.  There is no clavicular abnormality no scapular out abnormality no abnormalities of the upper extremity his breath sounds are clear bilaterally heart tones are normal abdomen is soft and benign with minimal tenderness in the right upper quadrant there is no peritoneal irritation no real bruising noted in the right upper quadrant of the abdomen or right lateral chest wall.  There is no palpable masses in the lower abdomen.  He has no inguinal lymphadenopathy his genitalia are normal his lower extremities appear to be atraumatic.  Oriented x 3  Weight/BMI: Body mass index is 24.39 kg/m².  /78   Pulse 88   Temp 36.9 °C (98.5 °F) (Temporal)   Resp 20   Ht 1.778 m (5' 10\")   Wt 77.1 kg (170 lb)   SpO2 98%     Not recorded         Pertinent Lab/Test/Imaging Review:  Patient's radiologic evaluation is basically summarized above.  I am completely unsure about the interpretation sigmoid colon.  He does have a low-density somewhat irregularly shaped area in the central portion of the right lobe of the liver which does not abut up against the kerwin.  I suppose technically it is at least a grade 3 liver injury it does not really look like metastatic disease to me.    Assessment and Plan:     Closed fracture of multiple ribs of right side- (present on admission)  Assessment & Plan  Right 1-6th nondisplaced rib fractures.  Aggressive multimodal pain management and pulmonary " hygiene. Serial chest radiographs.    Liver laceration, grade IV, without open wound into cavity- (present on admission)  Assessment & Plan  Grade 4 hepatic laceration with no evidence of active extravasation.    Blunt chest trauma- (present on admission)  Assessment & Plan  EKG, trop pending.    Contraindication to deep vein thrombosis (DVT) prophylaxis- (present on admission)  Assessment & Plan  Prophylactic anticoagulation for thrombotic prevention initially contraindicated secondary to elevated bleeding risk.    Trauma- (present on admission)  Assessment & Plan  Ice damn from roof fell on him 24 hrs prior to presenting.  Trauma Yellow Transfer Activation from [facility] in [place], [place].  Nestor Roque MD. Trauma Surgery.      Considering the patient's stability and somewhat inconsistent history over the last 36 hours I think the age of these injuries are probably unknown.  The patient has however passed the test of time already.  It seems reasonable to admit him for short period of observation as an inpatient.  We will trend liver function test check a CEA and a troponin.  I do not think ICU observation is warranted    Nestor Roque M.D.

## 2023-04-13 NOTE — ASSESSMENT & PLAN NOTE
Incidental finding. Asymmetric focal colonic wall thickening/mass associated with the mesenteric margin of the mid sigmoid colon concerning for colonic mass/carcinoma.  CEA 1.6.  Recommend colonoscopy outpatient.

## 2023-04-13 NOTE — ASSESSMENT & PLAN NOTE
Grade 4 hepatic laceration with no evidence of active extravasation.  24 hrs post injury. Hemoglobin stable.  Trend hemograms.

## 2023-04-13 NOTE — DISCHARGE INSTRUCTIONS
- Call or seek medical attention for questions or concerns  - Follow up with the [facility] RETURN: 1 week  - Follow up with a primary care provider, if you are not established please do so so that you may be referred to gastroenterology for colonoscopy  - Please have colonoscopy performed as soon as possible after discharge  - Resume regular diet  - May take over the counter acetaminophen as needed for pain  - Continue daily over the counter stool softener while on narcotics  - No operation of machinery or motorized vehicles while under the influence of narcotics  - No alcohol, marijuana or illicit drug use while under the influence of narcotics  - In the event of a narcotic overdose naloxone (Narcan) is available without a prescription from any [facility] or [facility]  - No swimming, hot tubs, baths or wound submersion until cleared by outpatient provider. May shower  - No contact sports, strenuous activities, or heavy lifting until cleared by outpatient provider  - If respiratory decompensation, persistent or worsening pain or signs or symptoms of infection occur seek medical attention        - Llame o busque atención médica si tiene preguntas o inquietudes  - Seguimiento con la [facility]: 1 semana  - Cici un seguimiento con un proveedor de atención primaria, si no está establecido, hágalo para que lo remitan a gastroenterología para imelda colonoscopia.  - Realícese imelda colonoscopia lo antes posible después del remington.  - Reanudar la dieta habitual.  - Puede davin paracetamol de venta rock según sea necesario para el dolor  - Continúe diariamente con el ablandador de heces de venta rock mientras melissa narcóticos  - No operar maquinaria o vehículos motorizados bajo la influencia de estupefacientes  - No consumir alcohol, marihuana o drogas ilícitas bajo la influencia de estupefacientes  - En el jessy de imelda sobredosis de narcóticos, la naloxona  (Narcan) está disponible sin receta en cualquier [facility] o [facility]  - No nadar, jacuzzis, kylie o sumergir heridas hasta que lo autorice un proveedor ambulatorio. Puede ducharse  - No deportes de contacto, actividades extenuantes o levantar objetos pesados hasta que lo autorice un proveedor ambulatorio  - Si se presenta descompensación respiratoria, dolor persistente o que empeora o signos o síntomas de infección buscar atención médica      Cuidados de imelda fractura (genérico)  (Fracture Care, Generic)  Los 206 huesos del cuerpo humano son importantes para sostenerlo (esqueleto) y también para la producción de glóbulos rojos en la médula ósea. Imelda fractura es la ruptura de un tejido. Un tejido es imelda acumulación de células que realizan imelda función en nuestro cuerpo. Normalmente pensamos en fracturas de huesos. Cuando un hueso se rompe o fractura, afecta no sólo a la función y a la producción de yara. También podría diana lesiones u otros daños cuando se lesionan las estructuras cercana a los huesos.   Hay jimmy tipos principales de fracturas:  Abierta cuando imelda herida lleva al sitio de la fractura o el hueso sale hacia afuera de la piel.   Cerrada cuando el hueso está fracturado darby no hay imelda herida externa.   Complicada esto puede suponer daño a los órganos vitales asociados y vasos sanguíneos mayores lyudmila resultado de la fractura.   Las fracturas normalmente se controlan manteniendo los huesos en scott lugar el tiempo suficiente para que se curen. [place] generalmente se realiza utilizando un entablillado o yeso. A veces se requiere cirugía y se utilizan clavos, placas o tornillos para sostener las fracturas en la posición adecuada. La cantidad de tiempo que melissa imelda fractura en curarse depende mayormente de la edad y la winsome del paciente.  Los niños pequeños son propensos a las fracturas. Ericka tipo de fracturas se curan rápidamente. Las fracturas más comunes sufridas por los niños tienden a asociarse con  "los brazos y muñecas. Deandre los huesos jóvenes no se endurecen por algunos años, las fractura de los niños tienden a \"doblarse y astillarse\", deandre imelda alicia rota de un árbol. Por eso se la llama \"fractura en tallo tang\".  A medida que crecemos, podría diana imelda pérdida del hueso conocida deandre osteopenia u osteoporosis. Estas enfermedades hacen que un hueso se rompa muy fácilmente. A veces un accidente iesha o el simple sobre-uso podría producir imelda fractura. Estas fracturas no se curan tan rápido deandre en imelda persona joven.  SÍNTOMAS  Los signos y síntomas de las fracturas de huesos dependen de qué tan grave sea la lesión. Si hay shock, podría diana piel pálida, fría y húmeda con pulso rápido y débil. Suele diana dolor e molestias en la roger de la fractura. Puede o no diana deformidad del hueso. Podría diana lesión en los tejidos que lo rodean.  TRATAMIENTO  El cuidado y tratamiento de las fracturas se basa en la inmovilización y entablillado adecuado de la lesión. Si la fractura es compleja, la herida asociada con imelda fractura abierta podría ser difícil de manipular sin ayuda profesional.   Si no puede restaurarse el pulso del extremo del miembro (pulso distal) con imelda tracción suave, deberá estabilizarse en scott posición actual. Deberá llamar a la ambulancia con urgencia. No pierda tiempo con el entablillado.   En general, las fracturas de miembros deberán inmovilizarse y asistirse por médicos. En zonas lejanas o a alguna distancia de la asistencia médica, puede requerir el siguiente tratamiento.   FRACTURA DEL ANTEBRAZO  Controle el pulso en el extremo del miembro. Si no lo hay, realice imelda tracción suave hasta que aparezca.   Trate cualquier herida.   Cubra en forma acolchada las prominencias óseas.   Coloque un entablillado adecuado.   Asegure las partes superiores e inferiores de la fractura, asegure las muñecas.   Reexamine el pulso o la reaparición de color y calor.   Eleve la lesión con un cabestrillo. "   FRACTURA DEL BRAZO  Controle el pulso en el extremo del miembro, si no lo hay, realice imelda tracción suave hasta que aparezca.   Trate cualquier herida.   Vende en forma acolchada entre el brazo y el pecho.   Coloque un cabestrillo con amanda ortopédico, asegure por encima y por debajo la fractura firmemente contra el pecho con un vendaje triangular.   Reexamine el pulso o la reaparición de color y calor.   PIERNA FRACTURADA  Controle el pulso y la circulación en el extremo del miembro (color de la piel y temperatura). Si no hay circulación, realice imelda tracción suave hasta que aparezca el pulso o el color.   Llame a imelda ambulancia al 911.   Trate cualquier herida.   Inmovilice el miembro.   Cubra acolchadamente las prominencias óseas.   Reexamine la circulación por debajo de la lesión.   FRACTURA DE PELVIS  Llame a imelda ambulancia al 911.   Controle el pulso en ambas piernas.   Doble las piernas por las rodillas, eleve suavemente la parte baja de las piernas y apóyelas en almohadas o algo acolchado.   Apoye las caderas sobre frazadas dobladas de cada lado.   Desaliente el intento de orinar.   Estos cuidados deben realizarse cuando se sospecha imelda fractura de pelvis. Esta lesión puede tener complicaciones graves. Deberá transportase siempre en imelda ambulancia y no por medios alternativos a menos que sea absolutamente necesario.   FRACTURA DE MANDÍBULA  Imelda lesión común en ciertos deportes de contacto es la dislocación o fractura de la parte inferior de la shantell (mandíbula). El herido sentirá dolor en la mandíbula, no podrá hablar willem y puede tener problemas para tragar.   Llame a imelda ambulancia al 911.   Sostenga la mandíbula.   Siente a la persona lesionada inclinada levemente hacia adelante.   Cici apoyar la mandíbula sobre imelda almohadilla sostenida por la misma persona herida.   NO aplique un vendaje para sostenerla.   Observe cuidadosamente al herido para detectar señales de dificultades para respirar, y  "cualquier signo de que se esté poniendo confuso o inconsciente.   CABESTRILLOS  Los cabestrillos se utilizan para sostener un brazo lesionado o para tratar otra lesión lyudmila imelda eduar fracturada. En general, el cabestrillo más efectivo se realiza con un vendaje triangular. Cualquier botiquín de primeros auxilios, debe tener al menos esperanza de estos vendajes.   Aunque son preferible los vendajes triangulares, puede utilizarse cualquier material (corbata, cinturón o un pedazo de cordel o soga) en ejssy de emergencia. Si no hay ningún material parecido a mano, el brazo lesionado puede sostenerse adecuadamente insertándolo dentro de la camisa o blusa de la persona. De manera similar, un imperdible que sostenga la manga junto a la camisa sobre el pecho, será suficiente.   Hay jimmy tipos esenciales de cabestrillos: el de brazo para lesiones del antebrazo, el elevado para lesiones en el hombro, y el cabestrillo con amanda ortopédico o ballestrinque para el brazo y lyudmila apoyo suplementario para la fractura de eduar.   Luego de la colocación de cualquier cabestrillo, controle siempre la circulación de la extremidad tomando el pulso en la ede, o presionando en un uña y observando el cambio de color en koko. Todos los cabestrillos deben estar en imelda posición cómoda para el herido. Nunca fuerce el brazo hacia la \"posición correcta\".   CABESTRILLO DE BRAZO  Coloque el antebrazo lesionado de forma paralela al suelo con la ede un poco más elevada que el codo.   Coloque un vendaje triangular entre el cuerpo y el brazo, con el ápice hacia el codo.   Extienda la parte superior del vendaje sobre el hombro del lado no afectado.   Coloque el punto mas bajo sobre el brazo, cruzando el hombro del lado lesionado para unirlo con la parte superior y átelo de manera firme con un nudo.   Asegure el codo doblando el exceso de vendaje sobre él y coloque un imperdible.   CABESTRILLO ELEVADO  Apoye el brazo lesionado con el codo al lado del " "cuerpo y la mano extendida hacia el hombro no lesionado.   Coloque un vendaje triangular sobre el antebrazo y la mano, con el ápice hacia el codo.   Extienda la parte superior del vendaje sobre el hombro del lado no afectado.   Pliegue la parte baja del vendaje bajo el brazo no lesionado, colóquelo debajo del codo y alrededor de la espalda y extienda la parte inferior para juntarla con la superior a la altura del codo.   Átelo de manera firme con un nudo.   Asegure el codo doblando el exceso del vendaje y colocando un imperdible, luego asegúrese de que el cabestrillo esté plegado debajo del brazo de manera firme.   CABESTRILLO CON DOREEN ORTOPÉDICO (BALLESTRINQUE).  Coloque el codo en imelda posición natural y coloque la mano extendida hacia el hombro sobre el lado no afectado.   Forme un ballestrinque con dos vueltas: imelda hacia usted y la otra separada de usted.   Junte los jarrod deslizando negin mano por debajo de ellos y cerrando con un movimiento de \"aplauso\". Si sabe hacer un ballestrinque, avtar esperanza directamente en la ede darby tenga cuidado de no  el brazo lesionado.   Deslice el ballestrinque sobre la mano y empuje suavemente para asegurar la ede.   Extienda los puntos de la venda para un lado del doreen y átelo de manera firme con un nudo.   Deje colgar el brazo de forma cómoda. Para apoyo a costillas fracturadas, coloque un vendaje triangular alrededor del cuerpo para sostener el brazo firmemente contra el pecho.   Si el médico le ha dado fecha para imelda visita de control, es importante que concurra. [place] incluye derivaciones ortopédicas, fisioterapia y rehabilitación. Toda demora en obtener la asistencia necesaria puede john paul lyudmila resultado imelda demora o fracaso en la adecuada curación. No cumplir con lori control puede john paul lyudmila resultado que el daño, el dolor o la discapacidad edward permanentes. Si tiene problemas para asistir al control, deberá comunicarlo en lori establecimiento para recibir " asesoramiento.   Document Released: 04/05/2010 Document Revised: 03/11/2013  ExitCare® Patient Information ©2013 [employer].    Laceración hepática  Liver Laceration    Imelda laceración hepática es un desgarro o un lee en el hígado. El hígado es un órgano que participa en muchas funciones corporales importantes. En algunos casos, imelda laceración hepática puede ser imelda lesión muy grave. Puede provocar imelda gran hemorragia y es posible que necesite cirugía. Otras veces, imelda laceración hepática puede ser iesha y es posible que solo necesite reposo en la cama. De cualquier manera, dilip siempre se requiere un tratamiento en el hospital.  Las laceraciones del hígado se clasifican en grados del 1 al 5. Los números más bajos identifican las laceraciones que son menos graves que aquellas indicadas en los números más altos.  Tommy 1: Se trata de imelda ruptura en el revestimiento exterior del hígado. Es de menos de ½ pulgada (1 cm) de profundidad.  Tommy 2: Es un desgarro de alrededor de ½ a 1 pulgada (1 a 3 cm) de profundidad. Tiene menos de 4 pulgadas (10 cm) de nadja.  Tommy 3: Es un desgarro de algo más que 1 pulgada (3 cm) de profundidad.  Grados 4 y 5: Estas heridas son muy profundas. Afectan gran parte del hígado.  ¿Cuáles son las causas?  Esta afección puede ser causada por lo siguiente:  Un golpe brandon en la roger alrededor del hígado (traumatismo cerrado), lyudmila en un accidente de automóvil. Un traumatismo puede desgarrar el hígado a pesar de que no se lastime la piel.  Imelda lesión en la que un objeto pasa a través de la piel hacia el hígado (lesión penetrante), lyudmila imelda herida de puñal o de suri.  ¿Cuáles son los signos o los síntomas?  Los síntomas frecuentes de esta afección incluyen los siguientes:  Un abdomen hinchado y firme.  Dolor en el abdomen.  Dolor al presionar sobre el lado derecho del abdomen.  Otros síntomas pueden ser los siguientes:  Sangrado que proviene de imelda herida penetrante.  Moretones en el  abdomen.  Latidos cardíacos acelerados.  Respiración rápida.  Sensación de debilidad y mareos.  ¿Cómo se diagnostica?  Para diagnosticar esta afección, el médico realizará un examen físico y le preguntará acerca de cualquier lesión que haya sufrido en el costado derecho del abdomen. Pueden hacerle diferentes pruebas, por ejemplo:  Análisis de yara. Podrán hacerle análisis de yara cada pocas horas. [place] mostrará si está perdiendo yara.  Exploración por tomografía computarizada (TC). Ericka estudio se hace para destinee si hay qi laceración o sangrado.  Qi laparoscopía. Consiste en colocar qi pequeña cámara en el abdomen y observar directamente la superficie del hígado.  ¿Cómo se trata?  El tratamiento depende de la profundidad de la laceración y la cantidad de sangrado. Las opciones de tratamiento incluyen:  Control y reposo en el hospital. Se le realizarán pruebas con frecuencia.  Recibir yara donada mediante un tubo (catéter) intravenoso (transfusión) para reemplazar la yara que ha perdido. Pueden ser necesarias varias transfusiones.  Cirugía para colocar gasa o un material especial alrededor de la laceración y ayudar a que esta sane o se repare.  Siga estas indicaciones en scott casa:  Webber los medicamentos de venta rock y los recetados solamente lyudmila se lo haya indicado el médico. No tome ningún otro medicamento a menos que lo consulte antes con el médico.  No conduzca ni use maquinaria pesada mientras melissa analgésicos recetados.  Cici reposo y limite las actividades según las indicaciones del médico. Puede ser que pasen varios meses antes de que pueda volver a scott rutina habitual. No participe en actividades que impliquen contacto físico o requieran energía extra hasta que el médico lo autorice.  Concurra a todas las visitas de control lyudmila se lo haya indicado el médico. [place] es importante.  Comuníquese con un médico si:  El dolor abdominal persiste.  Se siente más débil y cansado que de costumbre.  Solicite  ayuda de inmediato si:  El dolor abdominal empeora.  Tiene un lee en la piel:  Que tiene enrojecimiento, hinchazón o dolor a scott alrededor.  Del cual sale líquido o yara.  Que se siente caliente al tacto.  Del cual sale pus o mal olor.  Se siente mareado o muy débil.  Tiene dificultad para respirar.  Tiene fiebre.  Esta información no tiene lyudmila fin reemplazar el consejo del médico. Asegúrese de hacerle al médico cualquier pregunta que tenga.  Document Released: 09/11/2013 Document Revised: 11/07/2018 Document Reviewed: 07/01/2017  Elsevier Patient Education © 2020 [employer]
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
## 2023-04-13 NOTE — ASSESSMENT & PLAN NOTE
Right 1-6th nondisplaced rib fractures.  Aggressive multimodal pain management and pulmonary hygiene. Serial chest radiographs.

## 2023-04-13 NOTE — ASSESSMENT & PLAN NOTE
Prophylactic anticoagulation for thrombotic prevention initially contraindicated secondary to elevated bleeding risk.  4/13 Trauma surveillance venous duplex scanning ordered.

## 2023-04-13 NOTE — CARE PLAN
The patient is Stable - Low risk of patient condition declining or worsening    Shift Goals  Clinical Goals: monitor labs, pain control  Patient Goals: comfort, pain control  Family Goals: pain control    Progress made toward(s) clinical / shift goals:  Pain managed per MAR. Patient educated on non-pharmacological pain modalities.       Problem: Knowledge Deficit - Standard  Goal: Patient and family/care givers will demonstrate understanding of plan of care, disease process/condition, diagnostic tests and medications  Outcome: Progressing     Problem: Pain - Standard  Goal: Alleviation of pain or a reduction in pain to the patient’s comfort goal  Outcome: Progressing

## 2023-04-13 NOTE — DISCHARGE PLANNING
Referral: New PCP Appointment.    Intervention: WALDO Gibbs informed LSW patient is medically cleared for discharge home with O/P Follow Up. LSW spoke with ABDULLAHI Pierre .     Appointment Type: New PCP  Provider: Andrew Sanabria  Location: [address]  Appointment Date: 6/1/2023  Appointment Time: 7:25am, Check In: 7:10am    Plan: As Above.

## 2023-04-13 NOTE — ED NOTES
Pt. Moved into 21 from 21H at this time.  Pt. Placed on wall o2.  Family arrived at bedside at this time.  Pt. Denies needs.  Call light in reach.  All safety measures observed.
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
## 2023-04-14 NOTE — PROGRESS NOTES
Discharging Patient home per physician order.  Discharged with daughter.  Demonstrated understanding of discharge instructions, follow up appointments, home medications, prescriptions, and nursing care instructions for rib fractures.  Ambulating without assistance, voiding without difficulty, pain well controlled, tolerating oral medications, oxygen saturation greater than 90% , tolerating diet. Educational handouts given and discussed.  Verbalized understanding of discharge instructions and educational handouts.  Stated several reasons why to return to ED or seek medical attention. All questions answered.  Belongings and dressing supplies with patient at time of discharge.

## 2023-04-16 PROBLEM — D69.1 QUALITATIVE PLATELET DISORDER (HCC): Status: RESOLVED | Noted: 2023-04-13 | Resolved: 2023-04-16
